# Patient Record
Sex: MALE | Race: BLACK OR AFRICAN AMERICAN | NOT HISPANIC OR LATINO | URBAN - METROPOLITAN AREA
[De-identification: names, ages, dates, MRNs, and addresses within clinical notes are randomized per-mention and may not be internally consistent; named-entity substitution may affect disease eponyms.]

---

## 2019-05-23 ENCOUNTER — OFFICE (OUTPATIENT)
Dept: URBAN - METROPOLITAN AREA CLINIC 11 | Facility: CLINIC | Age: 74
End: 2019-05-23

## 2019-05-23 VITALS
HEIGHT: 70 IN | DIASTOLIC BLOOD PRESSURE: 80 MMHG | WEIGHT: 214 LBS | SYSTOLIC BLOOD PRESSURE: 129 MMHG | HEART RATE: 74 BPM

## 2019-05-23 DIAGNOSIS — R10.9 UNSPECIFIED ABDOMINAL PAIN: ICD-10-CM

## 2019-05-23 PROCEDURE — 99204 OFFICE O/P NEW MOD 45 MIN: CPT | Performed by: INTERNAL MEDICINE

## 2019-05-23 NOTE — SERVICENOTES
We discussed his right flank, right sided pain, his reports of his studies (and that I would like the results for review), and a dif dx including muscular/back pain, biliary issues, pulmonary issues, chest wall pain, and occult hernias (including litres hernias).  With the association to some foods, I would like a HIDA while we get his studies.

## 2019-05-23 NOTE — SERVICEHPINOTES
He presents for evaluation of abodminal pain.  He stated that he was dx with IBS about 12 years ago.  About 2 weeks ago he had a more acute pain in his back and right flank. He stated that the pain would come and go "like snatching a ball out".  He did not have n/v, f/c or other associated symptoms. He was seen in [place].  He had a CT and evaluation.  He was given a pain med that caused nausea. He was told that this was an IBS issue. He was then seen by Dr. Reza's office and had an u/s that he stated was normal.   He  He stated that after lettuce, slaw or apples that he has a "big ball in his side" in the same spot (but point more to his back/flank on the right that abdominal wall).  When we discussed his pain, there was an intermittent RUQ pain from time to time but it was different than his flank pain.  He has been on Nexium and was also started on an IBS med which helps.

## 2019-07-08 ENCOUNTER — OFFICE (OUTPATIENT)
Dept: URBAN - METROPOLITAN AREA CLINIC 11 | Facility: CLINIC | Age: 74
End: 2019-07-08

## 2019-07-08 VITALS
HEIGHT: 70 IN | HEART RATE: 65 BPM | WEIGHT: 212 LBS | DIASTOLIC BLOOD PRESSURE: 79 MMHG | SYSTOLIC BLOOD PRESSURE: 135 MMHG

## 2019-07-08 DIAGNOSIS — R10.9 UNSPECIFIED ABDOMINAL PAIN: ICD-10-CM

## 2019-07-08 DIAGNOSIS — R10.11 RIGHT UPPER QUADRANT PAIN: ICD-10-CM

## 2019-07-08 PROCEDURE — 99213 OFFICE O/P EST LOW 20 MIN: CPT | Performed by: INTERNAL MEDICINE

## 2019-07-08 NOTE — SERVICENOTES
We discussed his RUQ/Right mid axillary line pain, reviewed his HIDA (d/w him the potential of biliary dyskinesia), reviewed his other studies, discussed his diet.  We also discussed an EGD to r/o mucosal issues in that it had not been done in over 7 years and he has had recurrent symptoms.  He stated that he was currently doing well and would schedule it later in the summer but call if there are issues.

## 2019-07-08 NOTE — SERVICEHPINOTES
He presents for f/u of his RUQ pain.  He stated that he was doing "ok".  He has noted issues with lettuce, cherries, and apples.  He has not had issues with bananas.  With the diet changes he has done well and stated that "I always do ok if I avoid them". He has been on Nexium but has not been taking it consistently.  He has not had issues with hearburn.  He has used it for the pain in his right side and points to an area in the mix axillary line. He had a normal colonoscopy in 2018.  He had a benign Ct in April and normal LFTs/CBC/labs.   FONT style="BACKGROUND-COLOR: #ffffcc" visited="true"He had the HIDA and though normal the EF was 78%.  /FONT

## 2019-08-01 ENCOUNTER — AMBULATORY SURGICAL CENTER (OUTPATIENT)
Dept: URBAN - METROPOLITAN AREA SURGERY 3 | Facility: SURGERY | Age: 74
End: 2019-08-01

## 2019-08-01 ENCOUNTER — AMBULATORY SURGICAL CENTER (OUTPATIENT)
Dept: URBAN - METROPOLITAN AREA SURGERY 3 | Facility: SURGERY | Age: 74
End: 2019-08-01
Payer: MEDICARE

## 2019-08-01 ENCOUNTER — OFFICE (OUTPATIENT)
Dept: URBAN - METROPOLITAN AREA PATHOLOGY 22 | Facility: PATHOLOGY | Age: 74
End: 2019-08-01

## 2019-08-01 VITALS
SYSTOLIC BLOOD PRESSURE: 147 MMHG | HEART RATE: 77 BPM | DIASTOLIC BLOOD PRESSURE: 73 MMHG | HEART RATE: 72 BPM | DIASTOLIC BLOOD PRESSURE: 88 MMHG | RESPIRATION RATE: 19 BRPM | WEIGHT: 212 LBS | OXYGEN SATURATION: 97 % | HEART RATE: 70 BPM | RESPIRATION RATE: 19 BRPM | SYSTOLIC BLOOD PRESSURE: 159 MMHG | DIASTOLIC BLOOD PRESSURE: 98 MMHG | RESPIRATION RATE: 19 BRPM | HEART RATE: 72 BPM | HEART RATE: 72 BPM | OXYGEN SATURATION: 97 % | WEIGHT: 212 LBS | SYSTOLIC BLOOD PRESSURE: 150 MMHG | SYSTOLIC BLOOD PRESSURE: 159 MMHG | HEIGHT: 70 IN | SYSTOLIC BLOOD PRESSURE: 150 MMHG | TEMPERATURE: 97.8 F | DIASTOLIC BLOOD PRESSURE: 98 MMHG | RESPIRATION RATE: 20 BRPM | HEART RATE: 76 BPM | SYSTOLIC BLOOD PRESSURE: 148 MMHG | DIASTOLIC BLOOD PRESSURE: 71 MMHG | TEMPERATURE: 98 F | TEMPERATURE: 98 F | DIASTOLIC BLOOD PRESSURE: 88 MMHG | HEART RATE: 76 BPM | RESPIRATION RATE: 20 BRPM | OXYGEN SATURATION: 98 % | DIASTOLIC BLOOD PRESSURE: 98 MMHG | HEART RATE: 75 BPM | DIASTOLIC BLOOD PRESSURE: 71 MMHG | TEMPERATURE: 98 F | HEART RATE: 77 BPM | DIASTOLIC BLOOD PRESSURE: 75 MMHG | HEIGHT: 70 IN | OXYGEN SATURATION: 100 % | DIASTOLIC BLOOD PRESSURE: 71 MMHG | OXYGEN SATURATION: 97 % | WEIGHT: 212 LBS | OXYGEN SATURATION: 98 % | HEART RATE: 70 BPM | HEIGHT: 70 IN | HEART RATE: 75 BPM | HEART RATE: 70 BPM | TEMPERATURE: 97.8 F | DIASTOLIC BLOOD PRESSURE: 73 MMHG | SYSTOLIC BLOOD PRESSURE: 159 MMHG | DIASTOLIC BLOOD PRESSURE: 75 MMHG | DIASTOLIC BLOOD PRESSURE: 88 MMHG | DIASTOLIC BLOOD PRESSURE: 73 MMHG | RESPIRATION RATE: 20 BRPM | TEMPERATURE: 97.8 F | OXYGEN SATURATION: 98 % | SYSTOLIC BLOOD PRESSURE: 148 MMHG | OXYGEN SATURATION: 100 % | HEART RATE: 75 BPM | SYSTOLIC BLOOD PRESSURE: 147 MMHG | SYSTOLIC BLOOD PRESSURE: 148 MMHG | HEART RATE: 76 BPM | OXYGEN SATURATION: 100 % | DIASTOLIC BLOOD PRESSURE: 75 MMHG | HEART RATE: 77 BPM | SYSTOLIC BLOOD PRESSURE: 150 MMHG | SYSTOLIC BLOOD PRESSURE: 147 MMHG

## 2019-08-01 DIAGNOSIS — R10.11 RIGHT UPPER QUADRANT PAIN: ICD-10-CM

## 2019-08-01 DIAGNOSIS — K44.9 DIAPHRAGMATIC HERNIA WITHOUT OBSTRUCTION OR GANGRENE: ICD-10-CM

## 2019-08-01 DIAGNOSIS — K31.89 OTHER DISEASES OF STOMACH AND DUODENUM: ICD-10-CM

## 2019-08-01 PROCEDURE — 88313 SPECIAL STAINS GROUP 2: CPT | Performed by: INTERNAL MEDICINE

## 2019-08-01 PROCEDURE — G8918 PT W/O PREOP ORDER IV AB PRO: HCPCS | Performed by: INTERNAL MEDICINE

## 2019-08-01 PROCEDURE — 88342 IMHCHEM/IMCYTCHM 1ST ANTB: CPT | Performed by: INTERNAL MEDICINE

## 2019-08-01 PROCEDURE — 43239 EGD BIOPSY SINGLE/MULTIPLE: CPT | Performed by: INTERNAL MEDICINE

## 2019-08-01 PROCEDURE — G8907 PT DOC NO EVENTS ON DISCHARG: HCPCS | Performed by: INTERNAL MEDICINE

## 2019-08-01 PROCEDURE — 88305 TISSUE EXAM BY PATHOLOGIST: CPT | Performed by: INTERNAL MEDICINE

## 2019-08-01 RX ORDER — ESOMEPRAZOLE MAGNESIUM 40 MG/1
CAPSULE, DELAYED RELEASE ORAL
Qty: 30 | Refills: 3 | Status: COMPLETED
End: 2023-03-01

## 2019-10-09 ENCOUNTER — OFFICE (OUTPATIENT)
Dept: URBAN - METROPOLITAN AREA CLINIC 9 | Facility: CLINIC | Age: 74
End: 2019-10-09

## 2019-10-09 VITALS
HEART RATE: 64 BPM | SYSTOLIC BLOOD PRESSURE: 145 MMHG | HEIGHT: 70 IN | DIASTOLIC BLOOD PRESSURE: 89 MMHG | WEIGHT: 211 LBS

## 2019-10-09 DIAGNOSIS — K21.9 GASTRO-ESOPHAGEAL REFLUX DISEASE WITHOUT ESOPHAGITIS: ICD-10-CM

## 2019-10-09 DIAGNOSIS — R10.9 UNSPECIFIED ABDOMINAL PAIN: ICD-10-CM

## 2019-10-09 DIAGNOSIS — M54.9 DORSALGIA, UNSPECIFIED: ICD-10-CM

## 2019-10-09 PROCEDURE — 99213 OFFICE O/P EST LOW 20 MIN: CPT | Performed by: INTERNAL MEDICINE

## 2021-10-04 ENCOUNTER — OFFICE (OUTPATIENT)
Dept: URBAN - METROPOLITAN AREA CLINIC 10 | Facility: CLINIC | Age: 76
End: 2021-10-04

## 2021-10-04 VITALS
OXYGEN SATURATION: 99 % | HEIGHT: 70 IN | DIASTOLIC BLOOD PRESSURE: 86 MMHG | WEIGHT: 213 LBS | SYSTOLIC BLOOD PRESSURE: 147 MMHG | HEART RATE: 77 BPM

## 2021-10-04 DIAGNOSIS — K25.7 CHRONIC GASTRIC ULCER WITHOUT HEMORRHAGE OR PERFORATION: ICD-10-CM

## 2021-10-04 DIAGNOSIS — R93.3 ABNORMAL FINDINGS ON DIAGNOSTIC IMAGING OF OTHER PARTS OF DI: ICD-10-CM

## 2021-10-04 DIAGNOSIS — R10.9 UNSPECIFIED ABDOMINAL PAIN: ICD-10-CM

## 2021-10-04 DIAGNOSIS — K59.00 CONSTIPATION, UNSPECIFIED: ICD-10-CM

## 2021-10-04 PROCEDURE — 99214 OFFICE O/P EST MOD 30 MIN: CPT | Performed by: INTERNAL MEDICINE

## 2021-10-04 RX ORDER — AMITRIPTYLINE HYDROCHLORIDE 10 MG/1
TABLET, FILM COATED ORAL
Qty: 30 | Refills: 11 | Status: COMPLETED
Start: 2021-10-04 | End: 2024-03-04

## 2022-02-08 ENCOUNTER — OFFICE (OUTPATIENT)
Dept: URBAN - METROPOLITAN AREA CLINIC 10 | Facility: CLINIC | Age: 77
End: 2022-02-08

## 2022-02-08 VITALS
HEIGHT: 70 IN | HEART RATE: 86 BPM | SYSTOLIC BLOOD PRESSURE: 148 MMHG | DIASTOLIC BLOOD PRESSURE: 93 MMHG | WEIGHT: 214 LBS | OXYGEN SATURATION: 98 %

## 2022-02-08 DIAGNOSIS — R93.3 ABNORMAL FINDINGS ON DIAGNOSTIC IMAGING OF OTHER PARTS OF DI: ICD-10-CM

## 2022-02-08 PROCEDURE — 99214 OFFICE O/P EST MOD 30 MIN: CPT | Performed by: INTERNAL MEDICINE

## 2022-02-08 NOTE — SERVICEHPINOTES
Mr. Miles is 76 years old. He is here for follow-up of finding of 2 cm mass on the greater curvature of the stomach. He had EUS scheduled however, the night prior to his procedure he developed chest pain and was instructed to go to the emergency room.  He states he has tried to reschedule multiple times unsuccessfully.Review of records from [facility] demonstrates negative troponin x2, negative CTA for pulmonary embolism, an unremarkable CMP and CBC. BMP was also normal. He states he has a cardiologist although, it appears he has no cardiovascular history other than hypertension  He had an MRI at Dr. Chan's office that demonstrated colonic diverticulosis without diverticulitis but with a 2 cm mass arising off the greater curvature of the stomach. He states he had a negative EGD this year at Dr. Reza's office. 
[place] of records from Dr. Reza:br-05/2021:br--EGD for epigastric pain: Esophageal stricture dilated to 50 Indonesian with Acevedo dilator. Distal and mid esophageal biopsies were negative. Antral ulcer with antral and body gastritis with biopsies demonstrating minimal chronic inactive gastritis negative for H pylori. Normal duodenum with normal duodenal biopsies. 08/2019-EGD by Dr. Rose for right upper quadrant pain: Small hiatal hernia. Normal esophagus. 2 small healing ulcers on antral fold of mucosa with biopsies demonstrating reactive gastropathy in vascular ectasia. Duodenum normal.05/2019-HIDA scan with ejection fraction normal with ejection fraction of 80%Review of records from Dr. Reza:br-06/2018-colonoscopy for personal and family history of colon polyps: Normal other than hemorrhoids. Suggestion to repeat in 10 years.br-08/2014-EGD for epigastric pain reflux: Gastric body erythema with biopsies demonstrating reactive gastropathy. Antral erythema with biopsies demonstrating erosive gastritis and reactive gastropathy. Both antrum and body negative for H pylori. Duodenal bulb erythema with negative biopsies. Remainder of duodenum appeared normal with negative biopsies.

## 2022-03-15 ENCOUNTER — ON CAMPUS - OUTPATIENT (OUTPATIENT)
Dept: URBAN - METROPOLITAN AREA HOSPITAL 97 | Facility: HOSPITAL | Age: 77
End: 2022-03-15
Payer: MEDICARE

## 2022-03-15 DIAGNOSIS — K44.9 DIAPHRAGMATIC HERNIA WITHOUT OBSTRUCTION OR GANGRENE: ICD-10-CM

## 2022-03-15 DIAGNOSIS — K86.9 DISEASE OF PANCREAS, UNSPECIFIED: ICD-10-CM

## 2022-03-15 DIAGNOSIS — C49.A2 GASTROINTESTINAL STROMAL TUMOR OF STOMACH: ICD-10-CM

## 2022-03-15 PROCEDURE — 43242 EGD US FINE NEEDLE BX/ASPIR: CPT | Performed by: INTERNAL MEDICINE

## 2022-10-04 ENCOUNTER — OFFICE (OUTPATIENT)
Dept: URBAN - METROPOLITAN AREA CLINIC 10 | Facility: CLINIC | Age: 77
End: 2022-10-04

## 2022-10-04 VITALS
SYSTOLIC BLOOD PRESSURE: 134 MMHG | OXYGEN SATURATION: 97 % | HEIGHT: 70 IN | DIASTOLIC BLOOD PRESSURE: 84 MMHG | WEIGHT: 210 LBS | HEART RATE: 67 BPM

## 2022-10-04 DIAGNOSIS — K59.00 CONSTIPATION, UNSPECIFIED: ICD-10-CM

## 2022-10-04 DIAGNOSIS — R12 HEARTBURN: ICD-10-CM

## 2022-10-04 PROCEDURE — 99213 OFFICE O/P EST LOW 20 MIN: CPT | Performed by: INTERNAL MEDICINE

## 2022-10-04 RX ORDER — ESOMEPRAZOLE MAGNESIUM 40 MG/1
CAPSULE, DELAYED RELEASE ORAL
Qty: 90 | Refills: 3 | Status: ACTIVE
Start: 2022-10-04

## 2023-03-01 ENCOUNTER — OFFICE (OUTPATIENT)
Dept: URBAN - METROPOLITAN AREA CLINIC 10 | Facility: CLINIC | Age: 78
End: 2023-03-01

## 2023-03-01 VITALS
OXYGEN SATURATION: 97 % | SYSTOLIC BLOOD PRESSURE: 143 MMHG | HEART RATE: 73 BPM | HEIGHT: 70 IN | WEIGHT: 210 LBS | DIASTOLIC BLOOD PRESSURE: 81 MMHG

## 2023-03-01 DIAGNOSIS — R11.0 NAUSEA: ICD-10-CM

## 2023-03-01 DIAGNOSIS — R12 HEARTBURN: ICD-10-CM

## 2023-03-01 DIAGNOSIS — K59.00 CONSTIPATION, UNSPECIFIED: ICD-10-CM

## 2023-03-01 PROCEDURE — 99213 OFFICE O/P EST LOW 20 MIN: CPT | Performed by: PHYSICIAN ASSISTANT

## 2023-03-01 RX ORDER — SUCRALFATE 1 G/10ML
SUSPENSION ORAL
Qty: 1260 | Refills: 0 | Status: ACTIVE

## 2023-08-31 ENCOUNTER — OFFICE (OUTPATIENT)
Dept: URBAN - METROPOLITAN AREA CLINIC 10 | Facility: CLINIC | Age: 78
End: 2023-08-31

## 2023-08-31 VITALS
SYSTOLIC BLOOD PRESSURE: 146 MMHG | HEIGHT: 70 IN | WEIGHT: 213 LBS | DIASTOLIC BLOOD PRESSURE: 84 MMHG | HEART RATE: 77 BPM | OXYGEN SATURATION: 98 %

## 2023-08-31 DIAGNOSIS — R14.0 ABDOMINAL DISTENSION (GASEOUS): ICD-10-CM

## 2023-08-31 DIAGNOSIS — K21.9 GASTRO-ESOPHAGEAL REFLUX DISEASE WITHOUT ESOPHAGITIS: ICD-10-CM

## 2023-08-31 DIAGNOSIS — K58.9 IRRITABLE BOWEL SYNDROME WITHOUT DIARRHEA: ICD-10-CM

## 2023-08-31 DIAGNOSIS — K59.00 CONSTIPATION, UNSPECIFIED: ICD-10-CM

## 2023-08-31 PROCEDURE — 99214 OFFICE O/P EST MOD 30 MIN: CPT | Performed by: NURSE PRACTITIONER

## 2023-08-31 RX ORDER — LUBIPROSTONE 8 UG/1
CAPSULE, GELATIN COATED ORAL
Qty: 60 | Refills: 5 | Status: COMPLETED
Start: 2023-08-31 | End: 2024-03-04

## 2024-03-04 ENCOUNTER — OFFICE (OUTPATIENT)
Dept: URBAN - METROPOLITAN AREA CLINIC 10 | Facility: CLINIC | Age: 79
End: 2024-03-04
Payer: COMMERCIAL

## 2024-03-04 VITALS
DIASTOLIC BLOOD PRESSURE: 88 MMHG | HEIGHT: 70 IN | HEART RATE: 82 BPM | SYSTOLIC BLOOD PRESSURE: 144 MMHG | OXYGEN SATURATION: 97 % | WEIGHT: 204 LBS

## 2024-03-04 DIAGNOSIS — R63.4 ABNORMAL WEIGHT LOSS: ICD-10-CM

## 2024-03-04 DIAGNOSIS — K21.9 GASTRO-ESOPHAGEAL REFLUX DISEASE WITHOUT ESOPHAGITIS: ICD-10-CM

## 2024-03-04 DIAGNOSIS — K59.00 CONSTIPATION, UNSPECIFIED: ICD-10-CM

## 2024-03-04 PROCEDURE — 99214 OFFICE O/P EST MOD 30 MIN: CPT | Performed by: NURSE PRACTITIONER

## 2024-03-04 RX ORDER — ESOMEPRAZOLE MAGNESIUM 40 MG/1
CAPSULE, DELAYED RELEASE ORAL
Qty: 90 | Refills: 3 | Status: ACTIVE
Start: 2024-03-04

## 2024-03-04 RX ORDER — LUBIPROSTONE 8 UG/1
8 CAPSULE, GELATIN COATED ORAL
Qty: 30 | Refills: 5 | Status: ACTIVE

## 2024-03-04 RX ORDER — LUBIPROSTONE 8 UG/1
CAPSULE, GELATIN COATED ORAL
Qty: 60 | Refills: 5 | Status: COMPLETED
Start: 2023-08-31 | End: 2024-03-04

## 2024-03-04 NOTE — SERVICEHPINOTES
Mr. Miles is 78 years old. He is here today for follow up IBS, abdominal discomfort. States he is doing very well on Amitiza 8 mg twice daily as needed and esomeprazole 40 mg once a day. He has lost about 9 lb since last office visit which he attributes to being busy at [employer] working long days to repeat it. 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 Denies any overt GI bleeding. No dysphagia. No nausea or vomiting.03/2022-EUS: Small hiatal hernia. 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 1.3 cm hypoechoic mass in the peripheral aspect of the pancreas. Site of path demonstrates no malignant cells and the presence of lymphoid tissue.  Review of records from Dr. Reza:br-05/2021:br--EGD for epigastric pain: Esophageal stricture dilated to 50 French with Acevedo dilator. Distal and mid esophageal biopsies were negative. Antral ulcer with antral and body gastritis with biopsies demonstrating minimal chronic inactive gastritis negative for H pylori. Normal duodenum with normal duodenal biopsies.08/2019-EGD by Dr. Rose for right upper quadrant pain: Small hiatal hernia. Normal esophagus. 2 small healing ulcers on antral fold of mucosa with biopsies demonstrating reactive gastropathy in vascular ectasia. Duodenum normal.05/2019-HIDA scan with ejection fraction normal with ejection fraction of 80%Review of records from Dr. Reza:br-06/2018-colonoscopy for personal and family history of colon polyps: Normal other than hemorrhoids. Suggestion to repeat in 10 years.br-08/2014-EGD for epigastric pain reflux: Gastric body erythema with biopsies demonstrating reactive gastropathy. Antral erythema with biopsies demonstrating erosive gastritis and reactive gastropathy. Both antrum and body negative for H pylori. Duodenal bulb erythema with negative biopsies. Remainder of duodenum appeared normal with negative biopsies.

## 2024-04-30 ENCOUNTER — OFFICE (OUTPATIENT)
Dept: URBAN - METROPOLITAN AREA CLINIC 10 | Facility: CLINIC | Age: 79
End: 2024-04-30
Payer: COMMERCIAL

## 2024-04-30 DIAGNOSIS — K58.9 IRRITABLE BOWEL SYNDROME WITHOUT DIARRHEA: ICD-10-CM

## 2024-04-30 DIAGNOSIS — K21.9 GASTRO-ESOPHAGEAL REFLUX DISEASE WITHOUT ESOPHAGITIS: ICD-10-CM

## 2024-04-30 PROCEDURE — 99439 CHRNC CARE MGMT STAF EA ADDL: CPT | Performed by: INTERNAL MEDICINE

## 2024-04-30 PROCEDURE — 99490 CHRNC CARE MGMT STAFF 1ST 20: CPT | Performed by: INTERNAL MEDICINE

## 2025-03-07 ENCOUNTER — OFFICE (OUTPATIENT)
Dept: URBAN - METROPOLITAN AREA CLINIC 10 | Facility: CLINIC | Age: 80
End: 2025-03-07
Payer: MEDICARE

## 2025-03-07 VITALS
HEIGHT: 70 IN | DIASTOLIC BLOOD PRESSURE: 80 MMHG | SYSTOLIC BLOOD PRESSURE: 143 MMHG | OXYGEN SATURATION: 98 % | WEIGHT: 212 LBS | HEART RATE: 74 BPM

## 2025-03-07 DIAGNOSIS — K21.9 GASTRO-ESOPHAGEAL REFLUX DISEASE WITHOUT ESOPHAGITIS: ICD-10-CM

## 2025-03-07 DIAGNOSIS — K92.1 MELENA: ICD-10-CM

## 2025-03-07 DIAGNOSIS — K59.00 CONSTIPATION, UNSPECIFIED: ICD-10-CM

## 2025-03-07 PROCEDURE — 99214 OFFICE O/P EST MOD 30 MIN: CPT | Performed by: NURSE PRACTITIONER

## 2025-03-07 RX ORDER — LUBIPROSTONE 8 UG/1
8 CAPSULE, GELATIN COATED ORAL
Qty: 30 | Refills: 5 | Status: ACTIVE

## 2025-03-07 RX ORDER — ESOMEPRAZOLE MAGNESIUM 40 MG/1
CAPSULE, DELAYED RELEASE PELLETS ORAL
Qty: 90 | Refills: 3 | Status: ACTIVE
Start: 2022-10-04

## 2025-03-07 NOTE — SERVICEHPINOTES
Mr. Miles is 79 years old.   He is here for follow up intermittent nausea, reflux, constipation. He experiences intermittent nausea and describes pain that 'comes and goes,' primarily occurring at night. Medication for gas provides some relief. No heartburn is present, but there is a passive return of stomach vapors, characterized by an odor rather than a burning sensation. Occasional belching and a food smell after eating are noted. He has been taking Nexium, which he believes effectively manages his symptoms, as there is no burning or return of solid stomach contents into the mouth unless lying down too soon after eating. He has not been using naproxen recently.Regarding constipation, he takes Miralax as needed, prescribed by a physician in [place], and occasionally uses lubiprostone (Amitiza) to avoid accidents. He reports having about four bowel movements a week, with a fifth if he takes the lubiprostone. He is cautious about not becoming dependent on chemical assistance for bowel movements.He is scheduled for an upper endoscopy on the 27th of this month due to ongoing pain and previously reported black stools, which he attributes to self-medication with prednisone causing bleeding. He has used Gaviscon but not in significant amounts.He mentions a history of headaches for which he was taking amitriptyline, but has not used it for about a week. The headaches sometimes last four days in a row, with relief upon taking the medication. An upcoming appointment with an allergy doctor is scheduled for further evaluation of these headaches.
br
br03/2022-EUS: Small hiatal hernia. 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 1.3 cm hypoechoic mass in the peripheral aspect of the pancreas. Site of path demonstrates no malignant cells and the presence of lymphoid tissue.  Review of records from Dr. Reza:br-05/2021:br--EGD for epigastric pain: Esophageal stricture dilated to 50 French with Acevedo dilator. Distal and mid esophageal biopsies were negative. Antral ulcer with antral and body gastritis with biopsies demonstrating minimal chronic inactive gastritis negative for H pylori. Normal duodenum with normal duodenal biopsies.08/2019-EGD by Dr. Rose for right upper quadrant pain: Small hiatal hernia. Normal esophagus. 2 small healing ulcers on antral fold of mucosa with biopsies demonstrating reactive gastropathy in vascular ectasia. Duodenum normal.05/2019-HIDA scan with ejection fraction normal with ejection fraction of 80%Review of records from Dr. Reza:br-06/2018-colonoscopy for personal and family history of colon polyps: Normal other than hemorrhoids. Suggestion to repeat in 10 years.br-08/2014-EGD for epigastric pain reflux: Gastric body erythema with biopsies demonstrating reactive gastropathy. Antral erythema with biopsies demonstrating erosive gastritis and reactive gastropathy. Both antrum and body negative for H pylori. Duodenal bulb erythema with negative biopsies. Remainder of duodenum appeared normal with negative biopsies.

## 2025-03-27 ENCOUNTER — OFFICE (OUTPATIENT)
Dept: URBAN - METROPOLITAN AREA PATHOLOGY 12 | Facility: PATHOLOGY | Age: 80
End: 2025-03-27
Payer: COMMERCIAL

## 2025-03-27 ENCOUNTER — AMBULATORY SURGICAL CENTER (OUTPATIENT)
Dept: URBAN - METROPOLITAN AREA SURGERY 1 | Facility: SURGERY | Age: 80
End: 2025-03-27
Payer: MEDICARE

## 2025-03-27 VITALS
HEIGHT: 70 IN | SYSTOLIC BLOOD PRESSURE: 128 MMHG | HEART RATE: 76 BPM | WEIGHT: 213 LBS | TEMPERATURE: 97.3 F | OXYGEN SATURATION: 97 % | SYSTOLIC BLOOD PRESSURE: 123 MMHG | DIASTOLIC BLOOD PRESSURE: 56 MMHG | OXYGEN SATURATION: 94 % | DIASTOLIC BLOOD PRESSURE: 67 MMHG | HEART RATE: 65 BPM | OXYGEN SATURATION: 98 % | HEART RATE: 68 BPM | RESPIRATION RATE: 16 BRPM | HEART RATE: 74 BPM | DIASTOLIC BLOOD PRESSURE: 86 MMHG | DIASTOLIC BLOOD PRESSURE: 68 MMHG | SYSTOLIC BLOOD PRESSURE: 134 MMHG | RESPIRATION RATE: 18 BRPM | OXYGEN SATURATION: 96 % | TEMPERATURE: 97.7 F | SYSTOLIC BLOOD PRESSURE: 113 MMHG | HEART RATE: 88 BPM | DIASTOLIC BLOOD PRESSURE: 71 MMHG | SYSTOLIC BLOOD PRESSURE: 151 MMHG

## 2025-03-27 DIAGNOSIS — K29.50 UNSPECIFIED CHRONIC GASTRITIS WITHOUT BLEEDING: ICD-10-CM

## 2025-03-27 DIAGNOSIS — K44.9 DIAPHRAGMATIC HERNIA WITHOUT OBSTRUCTION OR GANGRENE: ICD-10-CM

## 2025-03-27 DIAGNOSIS — K92.1 MELENA: ICD-10-CM

## 2025-03-27 DIAGNOSIS — R10.13 EPIGASTRIC PAIN: ICD-10-CM

## 2025-03-27 DIAGNOSIS — D64.9 ANEMIA, UNSPECIFIED: ICD-10-CM

## 2025-03-27 PROCEDURE — 88342 IMHCHEM/IMCYTCHM 1ST ANTB: CPT | Performed by: PATHOLOGY

## 2025-03-27 PROCEDURE — 43239 EGD BIOPSY SINGLE/MULTIPLE: CPT | Performed by: INTERNAL MEDICINE

## 2025-03-27 PROCEDURE — 88305 TISSUE EXAM BY PATHOLOGIST: CPT | Performed by: PATHOLOGY

## 2025-03-27 PROCEDURE — 88313 SPECIAL STAINS GROUP 2: CPT | Performed by: PATHOLOGY

## 2025-03-31 ENCOUNTER — OFFICE (OUTPATIENT)
Dept: URBAN - METROPOLITAN AREA CLINIC 10 | Facility: CLINIC | Age: 80
End: 2025-03-31
Payer: MEDICARE

## 2025-03-31 DIAGNOSIS — K92.1 MELENA: ICD-10-CM

## 2025-03-31 DIAGNOSIS — K58.9 IRRITABLE BOWEL SYNDROME, UNSPECIFIED: ICD-10-CM

## 2025-03-31 DIAGNOSIS — K21.9 GASTRO-ESOPHAGEAL REFLUX DISEASE WITHOUT ESOPHAGITIS: ICD-10-CM

## 2025-03-31 PROCEDURE — 99490 CHRNC CARE MGMT STAFF 1ST 20: CPT | Performed by: NURSE PRACTITIONER

## 2025-04-01 LAB — GASTRO ONE PATHOLOGY: PDF REPORT: (no result)

## 2025-07-22 ENCOUNTER — OFFICE (OUTPATIENT)
Dept: URBAN - METROPOLITAN AREA CLINIC 10 | Facility: CLINIC | Age: 80
End: 2025-07-22
Payer: MEDICARE

## 2025-07-22 VITALS
DIASTOLIC BLOOD PRESSURE: 79 MMHG | SYSTOLIC BLOOD PRESSURE: 147 MMHG | OXYGEN SATURATION: 98 % | DIASTOLIC BLOOD PRESSURE: 84 MMHG | HEART RATE: 78 BPM | SYSTOLIC BLOOD PRESSURE: 137 MMHG | WEIGHT: 216 LBS | HEIGHT: 70 IN

## 2025-07-22 DIAGNOSIS — D50.9 IRON DEFICIENCY ANEMIA, UNSPECIFIED: ICD-10-CM

## 2025-07-22 PROBLEM — D64.9 ANEMIA, UNSPECIFIED: Status: ACTIVE | Noted: 2025-03-27

## 2025-07-22 PROCEDURE — 99214 OFFICE O/P EST MOD 30 MIN: CPT | Performed by: INTERNAL MEDICINE

## 2025-07-22 RX ORDER — ESOMEPRAZOLE MAGNESIUM 40 MG/1
CAPSULE, DELAYED RELEASE PELLETS ORAL
Qty: 90 | Refills: 3 | Status: ACTIVE
Start: 2022-10-04